# Patient Record
Sex: MALE | Race: WHITE | NOT HISPANIC OR LATINO
[De-identification: names, ages, dates, MRNs, and addresses within clinical notes are randomized per-mention and may not be internally consistent; named-entity substitution may affect disease eponyms.]

---

## 2022-10-18 ENCOUNTER — TRANSCRIPTION ENCOUNTER (OUTPATIENT)
Age: 37
End: 2022-10-18

## 2022-10-18 ENCOUNTER — APPOINTMENT (OUTPATIENT)
Dept: INTERNAL MEDICINE | Facility: CLINIC | Age: 37
End: 2022-10-18

## 2022-10-18 VITALS
HEIGHT: 68 IN | WEIGHT: 171 LBS | OXYGEN SATURATION: 97 % | DIASTOLIC BLOOD PRESSURE: 84 MMHG | BODY MASS INDEX: 25.91 KG/M2 | HEART RATE: 90 BPM | SYSTOLIC BLOOD PRESSURE: 123 MMHG

## 2022-10-18 DIAGNOSIS — Z00.00 ENCOUNTER FOR GENERAL ADULT MEDICAL EXAMINATION W/OUT ABNORMAL FINDINGS: ICD-10-CM

## 2022-10-18 DIAGNOSIS — Z23 ENCOUNTER FOR IMMUNIZATION: ICD-10-CM

## 2022-10-18 PROCEDURE — 99385 PREV VISIT NEW AGE 18-39: CPT | Mod: 25

## 2022-10-18 PROCEDURE — 36415 COLL VENOUS BLD VENIPUNCTURE: CPT

## 2022-10-18 RX ORDER — ALLOPURINOL 100 MG/1
100 TABLET ORAL
Refills: 0 | Status: ACTIVE | COMMUNITY

## 2022-10-18 NOTE — HEALTH RISK ASSESSMENT
[Good] : ~his/her~  mood as  good [Never] : Never [No] : No [No falls in past year] : Patient reported no falls in the past year

## 2022-10-18 NOTE — PHYSICAL EXAM

## 2022-10-18 NOTE — HISTORY OF PRESENT ILLNESS
[FreeTextEntry1] : Gout attacks;\par Taking Allopurinol 2019 ;\par No further attacks  [de-identified] : One baby Alive and well\par Mom and Dad \par One sister Healthy\par Hearing difficulties\par Intermittent shoulder pain Physical Therapy  on going\par On going exercise \par Got Flu vaccine \par Gets frequent respiratory infections\par Sept Covid     Fully vaccinated

## 2022-10-18 NOTE — ASSESSMENT
[FreeTextEntry1] : Normal examination except throat quite injected;\par Did take course of Penicillin given to him via [facility]\par Also will send to Rheumatology since I would stop allopurinol at this point since he has not head an attack since 2019 \par Also obtain labs to rule out mono

## 2022-10-19 ENCOUNTER — MED ADMIN CHARGE (OUTPATIENT)
Age: 37
End: 2022-10-19

## 2022-10-19 ENCOUNTER — TRANSCRIPTION ENCOUNTER (OUTPATIENT)
Age: 37
End: 2022-10-19

## 2022-10-19 LAB
BASOPHILS # BLD AUTO: 0.04 K/UL
BASOPHILS NFR BLD AUTO: 0.5 %
EOSINOPHIL # BLD AUTO: 0.15 K/UL
EOSINOPHIL NFR BLD AUTO: 2 %
HCT VFR BLD CALC: 45.1 %
HETEROPH AB SER QL: NEGATIVE
HGB BLD-MCNC: 15.6 G/DL
IMM GRANULOCYTES NFR BLD AUTO: 0.3 %
LYMPHOCYTES # BLD AUTO: 1.79 K/UL
LYMPHOCYTES NFR BLD AUTO: 23.6 %
MAN DIFF?: NORMAL
MCHC RBC-ENTMCNC: 30.5 PG
MCHC RBC-ENTMCNC: 34.6 GM/DL
MCV RBC AUTO: 88.3 FL
MONOCYTES # BLD AUTO: 0.58 K/UL
MONOCYTES NFR BLD AUTO: 7.6 %
NEUTROPHILS # BLD AUTO: 5.02 K/UL
NEUTROPHILS NFR BLD AUTO: 66 %
PLATELET # BLD AUTO: 247 K/UL
RBC # BLD: 5.11 M/UL
RBC # FLD: 12.1 %
WBC # FLD AUTO: 7.6 K/UL

## 2022-10-25 ENCOUNTER — APPOINTMENT (OUTPATIENT)
Dept: OTOLARYNGOLOGY | Facility: CLINIC | Age: 37
End: 2022-10-25

## 2022-10-25 VITALS
SYSTOLIC BLOOD PRESSURE: 114 MMHG | DIASTOLIC BLOOD PRESSURE: 75 MMHG | WEIGHT: 171 LBS | HEART RATE: 84 BPM | HEIGHT: 68 IN | BODY MASS INDEX: 25.91 KG/M2 | TEMPERATURE: 98.4 F | OXYGEN SATURATION: 97 %

## 2022-10-25 PROCEDURE — 99205 OFFICE O/P NEW HI 60 MIN: CPT | Mod: 25

## 2022-10-25 PROCEDURE — 31575 DIAGNOSTIC LARYNGOSCOPY: CPT

## 2022-10-25 RX ORDER — FLUTICASONE PROPIONATE 50 UG/1
50 SPRAY, METERED NASAL
Qty: 2 | Refills: 2 | Status: ACTIVE | COMMUNITY
Start: 2022-10-25 | End: 1900-01-01

## 2022-10-25 RX ORDER — FAMOTIDINE 20 MG/1
20 TABLET, FILM COATED ORAL
Qty: 90 | Refills: 0 | Status: ACTIVE | COMMUNITY
Start: 2022-10-25 | End: 1900-01-01

## 2022-10-25 NOTE — REASON FOR VISIT
[Initial Consultation] : an initial consultation for [FreeTextEntry2] : Persistent throat discomfort

## 2022-10-25 NOTE — PHYSICAL EXAM
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

## 2022-10-25 NOTE — HISTORY OF PRESENT ILLNESS
[de-identified] : 36 yo male who presents with concerns for chronic sore throat.  1 mo ago pt with at URI with sore throat, fatigue, fever, odynophagia, congestion and cough.  Most of the symptoms improved back to baseline other than sore throat.  Felt that sore throat lasted this entire time.  He tried netti pot, jessica tea, hot liquids, hot baths, cold pack, salt water gargles.  No issues currently chewing, eating or swallowing foods.  No breathing issues.  No voice changes.  Can feel like a fullness at times, but mostly like a sharpness.  Worse in the morning.\par \par Seen by PCP last week.  COVID, Strep, Mono, were all negative.  \par \par Diet:\par + 1 cup caffeine daily, chocolate, mint, blueberries, +citrus, tomato, garlic, onion, carbonated beverages, Kombucha\par Drinks 10 glasses of water daily\par

## 2022-10-25 NOTE — PROCEDURE
[de-identified] : -\par Procedure Note\par \par Pre-operative Diagnosis:  chronic throat pain\par Post-operative Diagnosis:  nasopharyngeal and pharyngeal edema and cobblestoning, postcricoid edema\par Anesthesia: Topical - 1 % Lidocaine/Phenylephrine\par Procedure:  Flexible Laryngoscopy\par  \par Procedure Details:  \par The patient was placed in the sitting position.  After decongestant and anesthesia were applied the laryngoscope was passed.  The nasal cavities, nasopharynx, oropharynx, hypopharynx, and larynx were all examined.  Vocal folds were examined during respiration and phonation.  The following findings were noted:\par \par Findings:  \par Nose: Septum is midline, turbinates are normal, nasal airways patent, mucosa normal\par Nasopharynx: Adenoids normal, no masses, eustachian tube normal, + evidence of pharyngeal cobblestoning\par Oropharynx: Pharyngeal walls symmetric and without lesion. Tonsils/fossae symmetric\par Hypopharynx: Hypopharynx and pyriform sinuses without lesion. No masses or asymmetry.  No pooling of secretions.\par Larynx:  Epiglottis and aryepiglottic folds were sharp and crisp bilaterally.  Bilateral false vocal folds normal appearance. Airway was widely patent.  + postcricoid edema\par TVF Appearance:  erythema of the vocal process bilaterally\par TVF Mobility:  normal\par Edema/hypertrophy: post cricoid\par Mucus on TVF: normal\par Glottic Closure: normal\par Supraglottic Hyperfunction: none\par Other Findings:\par \par Condition: Stable.  Patient tolerated procedure well.\par \par Complications: None\par

## 2022-10-25 NOTE — ASSESSMENT
[FreeTextEntry1] :  37-year-old gentleman who presents with concerns for persistent and chronic sore throat.  On exam there is evidence of some edema of the nasopharynx with cobblestoning.  There is also evidence of some postcricoid edema.  Based on history and physical exam, I do believe there is a component of laryngopharyngeal reflux.  At this time I am recommending dietary and behavioral change to reduce acid in the diet.  I am also recommending famotidine for a 3 months trial.    For nasal symptoms I am recommending nasal saline irrigation twice daily as well as nasal steroids.  Additionally I did send a throat culture to see if there is any underlying infection that we would be missing.  Patient will follow-up  via telehealth next week to review throat culture otherwise 3 months, sooner should symptoms worsen or fail to improve\par \par -  follow-up throat culture,  1 week via telehealth\par -  nasal saline irrigation, nasal steroids\par - Dietary and behavioral modification to reduce acid reflux, handout given\par - Famotidine qhs\par - Voice hygiene, increase hydration, sips of water throughout the day, avoid throat clearing\par - fu 3 mo\par

## 2022-10-26 ENCOUNTER — APPOINTMENT (OUTPATIENT)
Dept: INTERNAL MEDICINE | Facility: CLINIC | Age: 37
End: 2022-10-26

## 2022-10-31 ENCOUNTER — APPOINTMENT (OUTPATIENT)
Dept: OTOLARYNGOLOGY | Facility: CLINIC | Age: 37
End: 2022-10-31

## 2022-10-31 LAB — EAR NOSE AND THROAT CULTURE: ABNORMAL

## 2022-10-31 PROCEDURE — 99213 OFFICE O/P EST LOW 20 MIN: CPT | Mod: 95

## 2022-10-31 NOTE — HISTORY OF PRESENT ILLNESS
[de-identified] : 38 yo male who presents with concerns for chronic sore throat.  1 mo ago pt with at URI with sore throat, fatigue, fever, odynophagia, congestion and cough.  Most of the symptoms improved back to baseline other than sore throat.  Felt that sore throat lasted this entire time.  He tried netti pot, jessica tea, hot liquids, hot baths, cold pack, salt water gargles.  No issues currently chewing, eating or swallowing foods.  No breathing issues.  No voice changes.  Can feel like a fullness at times, but mostly like a sharpness.  Worse in the morning.\par \par Seen by PCP last week.  COVID, Strep, Mono, were all negative.  \par \par Diet:\par + 1 cup caffeine daily, chocolate, mint, blueberries, +citrus, tomato, garlic, onion, carbonated beverages, Kombucha\par Drinks 10 glasses of water daily\par - [FreeTextEntry1] : 10/31/2022\par  patient has been following low acid diet and behavioral modifications with some moderate improvement in symptoms.  No new ear, nose, throat symptoms otherwise.  Patient presents to review throat culture discussed neck steps.

## 2022-10-31 NOTE — ASSESSMENT
[FreeTextEntry1] :  37-year-old gentleman who presents with concerns for persistent and chronic sore throat.  On exam there is evidence of some edema of the nasopharynx with cobblestoning.  There is also evidence of some postcricoid edema.  Based on history and physical exam, I do believe there is a component of laryngopharyngeal reflux.  At this time I am recommending dietary and behavioral change to reduce acid in the diet.  I am also recommending famotidine for a 3 months trial.    For nasal symptoms I am recommending nasal saline irrigation twice daily as well as nasal steroids.  Additionally I did send a throat culture to see if there is any underlying infection that we would be missing.  Patient will follow-up  via telehealth next week to review throat culture otherwise 3 months, sooner should symptoms worsen or fail to improve\par \par Patient has been following the above regiment.  Additionally today presents to follow-up throat culture.  There was evidence of haemophilus which was susceptible to Augmentin.  This was discussed in detail.  Medication sent to pharmacy.  Patient will follow-up in 2 weeks with a repeat telehealth to see how he is doing and then 3 months to discuss throat issues in terms of reflux.\par \par \par -    Augmentin\par -  nasal saline irrigation, nasal steroids\par - Dietary and behavioral modification to reduce acid reflux, handout given\par - Famotidine qhs\par - Voice hygiene, increase hydration, sips of water throughout the day, avoid throat clearing\par - fu 3 mo\par

## 2022-10-31 NOTE — REASON FOR VISIT
[Home] : at home, [unfilled] , at the time of the visit. [Medical Office: ([facility])___] : at the medical office located in  [Patient] : the patient

## 2022-11-01 ENCOUNTER — TRANSCRIPTION ENCOUNTER (OUTPATIENT)
Age: 37
End: 2022-11-01

## 2022-11-15 ENCOUNTER — APPOINTMENT (OUTPATIENT)
Dept: OTOLARYNGOLOGY | Facility: CLINIC | Age: 37
End: 2022-11-15

## 2022-11-15 DIAGNOSIS — J02.9 ACUTE PHARYNGITIS, UNSPECIFIED: ICD-10-CM

## 2022-11-15 PROCEDURE — 99213 OFFICE O/P EST LOW 20 MIN: CPT | Mod: 95

## 2022-11-15 NOTE — HISTORY OF PRESENT ILLNESS
[de-identified] : 36 yo male who presents with concerns for chronic sore throat.  1 mo ago pt with at URI with sore throat, fatigue, fever, odynophagia, congestion and cough.  Most of the symptoms improved back to baseline other than sore throat.  Felt that sore throat lasted this entire time.  He tried netti pot, jessica tea, hot liquids, hot baths, cold pack, salt water gargles.  No issues currently chewing, eating or swallowing foods.  No breathing issues.  No voice changes.  Can feel like a fullness at times, but mostly like a sharpness.  Worse in the morning.\par \par Seen by PCP last week.  COVID, Strep, Mono, were all negative.  \par \par Diet:\par + 1 cup caffeine daily, chocolate, mint, blueberries, +citrus, tomato, garlic, onion, carbonated beverages, Kombucha\par Drinks 10 glasses of water daily\par -\par 10/31/2022\par  patient has been following low acid diet and behavioral modifications with some moderate improvement in symptoms.  No new ear, nose, throat symptoms otherwise.  Patient presents to review throat culture discussed neck steps.\par - [FreeTextEntry1] :  11/15/2022\par  patient completed the antibiotics.  He notes complete resolution of symptoms.  He feels back to baseline.  No new ear nose, throat symptoms otherwise.

## 2022-11-15 NOTE — ASSESSMENT
[FreeTextEntry1] :  37-year-old gentleman who presents with concerns for persistent and chronic sore throat.  On exam there is evidence of some edema of the nasopharynx with cobblestoning.  There is also evidence of some postcricoid edema.  Based on history and physical exam, I do believe there is a component of laryngopharyngeal reflux.  At this time I am recommending dietary and behavioral change to reduce acid in the diet.  I am also recommending famotidine for a 3 months trial.    For nasal symptoms I am recommending nasal saline irrigation twice daily as well as nasal steroids.  Additionally I did send a throat culture to see if there is any underlying infection that we would be missing.  Patient will follow-up  via telehealth next week to review throat culture otherwise 3 months, sooner should symptoms worsen or fail to improve\par \par Patient has been following the above regiment.  Additionally today presents to follow-up throat culture.  There was evidence of haemophilus which was susceptible to Augmentin.  This was discussed in detail.  Medication sent to pharmacy.  Patient will follow-up in 3 months to discuss throat issues in terms of reflux.\par \par \par - Nasal saline irrigation, nasal steroids\par - Dietary and behavioral modification to reduce acid reflux, handout given\par - Famotidine qhs\par - Voice hygiene, increase hydration, sips of water throughout the day, avoid throat clearing\par - fu 3 mo\par

## 2022-11-15 NOTE — REASON FOR VISIT
[Home] : at home, [unfilled] , at the time of the visit. [Medical Office: ([facility])___] : at the medical office located in  [Patient] : the patient [Subsequent Evaluation] : a subsequent evaluation for [FreeTextEntry2] : Pharyngitis

## 2022-11-23 ENCOUNTER — APPOINTMENT (OUTPATIENT)
Dept: RHEUMATOLOGY | Facility: CLINIC | Age: 37
End: 2022-11-23

## 2022-11-23 ENCOUNTER — RESULT REVIEW (OUTPATIENT)
Age: 37
End: 2022-11-23

## 2022-11-23 ENCOUNTER — NON-APPOINTMENT (OUTPATIENT)
Age: 37
End: 2022-11-23

## 2022-11-23 ENCOUNTER — OUTPATIENT (OUTPATIENT)
Dept: OUTPATIENT SERVICES | Facility: HOSPITAL | Age: 37
LOS: 1 days | End: 2022-11-23

## 2022-11-23 ENCOUNTER — APPOINTMENT (OUTPATIENT)
Dept: RADIOLOGY | Facility: CLINIC | Age: 37
End: 2022-11-23

## 2022-11-23 VITALS
SYSTOLIC BLOOD PRESSURE: 116 MMHG | TEMPERATURE: 98.4 F | HEART RATE: 70 BPM | DIASTOLIC BLOOD PRESSURE: 71 MMHG | BODY MASS INDEX: 25.01 KG/M2 | OXYGEN SATURATION: 96 % | WEIGHT: 165 LBS | HEIGHT: 68 IN

## 2022-11-23 DIAGNOSIS — M10.9 GOUT, UNSPECIFIED: ICD-10-CM

## 2022-11-23 PROCEDURE — 73620 X-RAY EXAM OF FOOT: CPT | Mod: 26,LT,RT

## 2022-11-23 PROCEDURE — 99204 OFFICE O/P NEW MOD 45 MIN: CPT | Mod: 25

## 2022-11-23 RX ORDER — NIRMATRELVIR AND RITONAVIR 300-100 MG
20 X 150 MG & KIT ORAL
Qty: 30 | Refills: 0 | Status: COMPLETED | COMMUNITY
Start: 2022-09-12

## 2022-11-23 RX ORDER — PENICILLIN V POTASSIUM 500 MG/1
500 TABLET, FILM COATED ORAL
Qty: 20 | Refills: 0 | Status: COMPLETED | COMMUNITY
Start: 2022-09-29

## 2022-11-23 RX ORDER — AMOXICILLIN AND CLAVULANATE POTASSIUM 875; 125 MG/1; MG/1
875-125 TABLET, COATED ORAL
Qty: 20 | Refills: 0 | Status: DISCONTINUED | COMMUNITY
Start: 2022-10-31 | End: 2022-11-23

## 2022-11-23 RX ORDER — COVID-19 ANTIGEN TEST
KIT MISCELLANEOUS
Qty: 2 | Refills: 0 | Status: COMPLETED | COMMUNITY
Start: 2022-11-13

## 2022-11-23 NOTE — HISTORY OF PRESENT ILLNESS
[FreeTextEntry1] : November 23, 2022\par Patient referred by Dr. Barclay for evaluation\par Patient with history of gout, diagnosed in 2018\par Started on allopurinol 100 mg in April 2019\par Patient has not had a gout attack since starting allopurinol in April 2019\par \par Had gout attacks August 2018, December 2018, February 2019, and  April 2019 \par Always occurred in the same joint, right 1st toe\par Had xrays which were unremarkable at first attack in 2018, no repeat xrays since that time\par At the time, unclear uric acid when diagnosed \par Review of previous records: 12/2019 uric acid 5.8\par \par When had flares, no colchicine use previously, only nsaids for pain\par No side effects from allopurinol \par No history of nephrolithiasis\par No history of tophi\par No history of diuretic use\par Recently started famotidine and fluticasone as trial for silent reflux from ENT\par \par +etoh\par +red meat\par +organ meat\par +shell fish\par \par Labs reviewed from 9/2022, no uric acid checked at that time\par

## 2022-11-23 NOTE — ASSESSMENT
[FreeTextEntry1] : 37-year-old man with history of gout, referred for rheumatology evaluation.  Patient diagnosed with gout in 2018, started on allopurinol 100 mg daily following recurrent flares of gout in the right first toe.  Since starting allopurinol in April 2019, patient has not has not had any subsequent flares.  Patient has also made dietary modifications since diagnosis but does continue high purine foods in moderation.  Review of literature regarding discontinuation of urate lowering therapies, suggests relapse of gout is common although delayed after discontinuation of ULT. Short-term prognosis after ULT discontinuation appears favorable if the serum urate level was low before ULT discontinuation.  Will update labs today, will obtain xray of the bilateral feet to evaluate for changes suggestive of gout, and will continue current allopurinol dose for now.  May consider reduction of dose to 50 mg qday pending serum uric acid level. \par \par Effects of Discontinuation of Urate-Lowering Therapy: A Systematic Review\par Katelynn Bernal 1, Renee Bailey 1, Tara Pike 2, Hubert Maisonneuve 3, lCinton Canales 4, Bettye Guillen 5

## 2022-11-23 NOTE — PHYSICAL EXAM
[General Appearance - Alert] : alert [General Appearance - In No Acute Distress] : in no acute distress [General Appearance - Well-Appearing] : healthy appearing [Sclera] : the sclera and conjunctiva were normal [] : no respiratory distress [Respiration, Rhythm And Depth] : normal respiratory rhythm and effort [Exaggerated Use Of Accessory Muscles For Inspiration] : no accessory muscle use [Edema] : there was no peripheral edema [Abnormal Walk] : normal gait [Nail Clubbing] : no clubbing  or cyanosis of the fingernails [Musculoskeletal - Swelling] : no joint swelling seen [Oriented To Time, Place, And Person] : oriented to person, place, and time [Impaired Insight] : insight and judgment were intact [Affect] : the affect was normal [Mood] : the mood was normal [FreeTextEntry1] : no active synovitis.  No tophi noted.

## 2022-11-28 ENCOUNTER — TRANSCRIPTION ENCOUNTER (OUTPATIENT)
Age: 37
End: 2022-11-28

## 2022-11-28 LAB
ALBUMIN SERPL ELPH-MCNC: 4.8 G/DL
ALP BLD-CCNC: 77 U/L
ALT SERPL-CCNC: 21 U/L
ANION GAP SERPL CALC-SCNC: 12 MMOL/L
AST SERPL-CCNC: 19 U/L
BILIRUB SERPL-MCNC: 0.4 MG/DL
BUN SERPL-MCNC: 23 MG/DL
CALCIUM SERPL-MCNC: 10 MG/DL
CHLORIDE SERPL-SCNC: 100 MMOL/L
CO2 SERPL-SCNC: 27 MMOL/L
CREAT SERPL-MCNC: 1.14 MG/DL
EGFR: 85 ML/MIN/1.73M2
GLUCOSE SERPL-MCNC: 86 MG/DL
POTASSIUM SERPL-SCNC: 4.5 MMOL/L
PROT SERPL-MCNC: 7.3 G/DL
SODIUM SERPL-SCNC: 139 MMOL/L
URATE SERPL-MCNC: 6.5 MG/DL

## 2023-01-24 ENCOUNTER — APPOINTMENT (OUTPATIENT)
Dept: OTOLARYNGOLOGY | Facility: CLINIC | Age: 38
End: 2023-01-24
Payer: MEDICAID

## 2023-01-24 VITALS
DIASTOLIC BLOOD PRESSURE: 74 MMHG | SYSTOLIC BLOOD PRESSURE: 115 MMHG | BODY MASS INDEX: 25.01 KG/M2 | HEIGHT: 68 IN | WEIGHT: 165 LBS | HEART RATE: 80 BPM | OXYGEN SATURATION: 98 %

## 2023-01-24 DIAGNOSIS — R09.81 NASAL CONGESTION: ICD-10-CM

## 2023-01-24 PROCEDURE — 99213 OFFICE O/P EST LOW 20 MIN: CPT

## 2023-01-24 NOTE — ASSESSMENT
[FreeTextEntry1] :  37-year-old gentleman who presents with concerns for persistent and chronic sore throat.  On exam there is evidence of some edema of the nasopharynx with cobblestoning.  There is also evidence of some postcricoid edema.    Patient was treated with nasal saline and nasal steroids, as well as antireflux regiment.  Previous throat culture was positive for haemophilus and patient treated with Augmentin.\par \par   At 3 months patient is completely back to baseline.  He denies any any symptoms at this time.  No ENT issues.  At this point we discussed how to wean from famotidine followed by a wean from Flonase.  Patient will then start to reintroduce foods back into his diet from an antireflux stand point.  He can follow-up with me as needed.\par \par –Wean from famotidine\par – Wean from Flonase\par – Reintroduce foods into his diet\par – Follow-up as needed

## 2023-01-24 NOTE — PHYSICAL EXAM
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

## 2023-01-24 NOTE — HISTORY OF PRESENT ILLNESS
[de-identified] : 38 yo male who presents with concerns for chronic sore throat.  1 mo ago pt with at URI with sore throat, fatigue, fever, odynophagia, congestion and cough.  Most of the symptoms improved back to baseline other than sore throat.  Felt that sore throat lasted this entire time.  He tried netti pot, jessica tea, hot liquids, hot baths, cold pack, salt water gargles.  No issues currently chewing, eating or swallowing foods.  No breathing issues.  No voice changes.  Can feel like a fullness at times, but mostly like a sharpness.  Worse in the morning.\par \par Seen by PCP last week.  COVID, Strep, Mono, were all negative.  \par \par Diet:\par + 1 cup caffeine daily, chocolate, mint, blueberries, +citrus, tomato, garlic, onion, carbonated beverages, Kombucha\par Drinks 10 glasses of water daily\par -\par 10/31/2022\par  patient has been following low acid diet and behavioral modifications with some moderate improvement in symptoms.  No new ear, nose, throat symptoms otherwise.  Patient presents to review throat culture discussed neck steps.\par - [FreeTextEntry1] :  11/15/2022\par  patient completed the antibiotics.  He notes complete resolution of symptoms.  He feels back to baseline.  No new ear nose, throat symptoms otherwise.\par -\par  1/24/2023\par  patient has been using nasal saline and nasal steroids.  He is also been using antireflux medication, famotidine at nighttime and following dietary and behavioral modification to reduce acid reflux.  He has had remarkable improvement.  He feels like he is completely back to baseline.  He is at very happy with the results.  He denies any ear, nose, throat symptoms at this time.

## 2023-05-12 ENCOUNTER — NON-APPOINTMENT (OUTPATIENT)
Age: 38
End: 2023-05-12

## 2023-05-13 ENCOUNTER — EMERGENCY (EMERGENCY)
Facility: HOSPITAL | Age: 38
LOS: 1 days | Discharge: HOME | End: 2023-05-13
Attending: STUDENT IN AN ORGANIZED HEALTH CARE EDUCATION/TRAINING PROGRAM | Admitting: STUDENT IN AN ORGANIZED HEALTH CARE EDUCATION/TRAINING PROGRAM
Payer: COMMERCIAL

## 2023-05-13 VITALS
SYSTOLIC BLOOD PRESSURE: 104 MMHG | DIASTOLIC BLOOD PRESSURE: 72 MMHG | TEMPERATURE: 97 F | WEIGHT: 162.04 LBS | OXYGEN SATURATION: 96 % | RESPIRATION RATE: 18 BRPM | HEIGHT: 67 IN | HEART RATE: 107 BPM

## 2023-05-13 VITALS
RESPIRATION RATE: 18 BRPM | OXYGEN SATURATION: 99 % | TEMPERATURE: 98 F | SYSTOLIC BLOOD PRESSURE: 110 MMHG | HEART RATE: 80 BPM | DIASTOLIC BLOOD PRESSURE: 75 MMHG

## 2023-05-13 DIAGNOSIS — R19.7 DIARRHEA, UNSPECIFIED: ICD-10-CM

## 2023-05-13 LAB
ADV 40+41 DNA STL QL NAA+NON-PROBE: SIGNIFICANT CHANGE UP
ALBUMIN SERPL ELPH-MCNC: 4.1 G/DL — SIGNIFICANT CHANGE UP (ref 3.3–5)
ALP SERPL-CCNC: 97 U/L — SIGNIFICANT CHANGE UP (ref 40–120)
ALT FLD-CCNC: 15 U/L — SIGNIFICANT CHANGE UP (ref 10–45)
ANION GAP SERPL CALC-SCNC: 11 MMOL/L — SIGNIFICANT CHANGE UP (ref 5–17)
APPEARANCE UR: CLEAR — SIGNIFICANT CHANGE UP
AST SERPL-CCNC: 14 U/L — SIGNIFICANT CHANGE UP (ref 10–40)
ASTROVIRUS: SIGNIFICANT CHANGE UP
BACTERIA # UR AUTO: ABNORMAL /HPF
BASOPHILS # BLD AUTO: 0.03 K/UL — SIGNIFICANT CHANGE UP (ref 0–0.2)
BASOPHILS NFR BLD AUTO: 0.3 % — SIGNIFICANT CHANGE UP (ref 0–2)
BILIRUB SERPL-MCNC: 0.5 MG/DL — SIGNIFICANT CHANGE UP (ref 0.2–1.2)
BILIRUB UR-MCNC: ABNORMAL
BUN SERPL-MCNC: 14 MG/DL — SIGNIFICANT CHANGE UP (ref 7–23)
C CAYETANENSIS DNA STL QL NAA+NON-PROBE: SIGNIFICANT CHANGE UP
CALCIUM SERPL-MCNC: 9.5 MG/DL — SIGNIFICANT CHANGE UP (ref 8.4–10.5)
CAMPYLOBACTER DNA SPEC NAA+PROBE: SIGNIFICANT CHANGE UP
CHLORIDE SERPL-SCNC: 99 MMOL/L — SIGNIFICANT CHANGE UP (ref 96–108)
CO2 SERPL-SCNC: 24 MMOL/L — SIGNIFICANT CHANGE UP (ref 22–31)
COLOR SPEC: YELLOW — SIGNIFICANT CHANGE UP
COMMENT - URINE: SIGNIFICANT CHANGE UP
CREAT SERPL-MCNC: 1.24 MG/DL — SIGNIFICANT CHANGE UP (ref 0.5–1.3)
CRYPTOSP DNA STL QL NAA+PROBE: SIGNIFICANT CHANGE UP
DIFF PNL FLD: NEGATIVE — SIGNIFICANT CHANGE UP
E COLI SXT SPEC: SIGNIFICANT CHANGE UP
E HISTOLYT DNA STL QL NAA+NON-PROBE: SIGNIFICANT CHANGE UP
EC EAEA GENE STL QL NAA+PROBE: SIGNIFICANT CHANGE UP
EGFR: 77 ML/MIN/1.73M2 — SIGNIFICANT CHANGE UP
EOSINOPHIL # BLD AUTO: 0.04 K/UL — SIGNIFICANT CHANGE UP (ref 0–0.5)
EOSINOPHIL NFR BLD AUTO: 0.4 % — SIGNIFICANT CHANGE UP (ref 0–6)
EPEC DNA STL QL NAA+PROBE: SIGNIFICANT CHANGE UP
EPI CELLS # UR: SIGNIFICANT CHANGE UP /HPF (ref 0–5)
ETEC DNA STL QL NAA+PROBE: SIGNIFICANT CHANGE UP
G LAMBLIA DNA STL QL NAA+NON-PROBE: SIGNIFICANT CHANGE UP
GI PCR PANEL: SIGNIFICANT CHANGE UP
GLUCOSE SERPL-MCNC: 118 MG/DL — HIGH (ref 70–99)
GLUCOSE UR QL: NEGATIVE — SIGNIFICANT CHANGE UP
HCT VFR BLD CALC: 54.1 % — HIGH (ref 39–50)
HGB BLD-MCNC: 18.2 G/DL — HIGH (ref 13–17)
IMM GRANULOCYTES NFR BLD AUTO: 0.3 % — SIGNIFICANT CHANGE UP (ref 0–0.9)
KETONES UR-MCNC: 40 MG/DL
LEUKOCYTE ESTERASE UR-ACNC: NEGATIVE — SIGNIFICANT CHANGE UP
LYMPHOCYTES # BLD AUTO: 2 K/UL — SIGNIFICANT CHANGE UP (ref 1–3.3)
LYMPHOCYTES # BLD AUTO: 21.4 % — SIGNIFICANT CHANGE UP (ref 13–44)
MCHC RBC-ENTMCNC: 30 PG — SIGNIFICANT CHANGE UP (ref 27–34)
MCHC RBC-ENTMCNC: 33.6 GM/DL — SIGNIFICANT CHANGE UP (ref 32–36)
MCV RBC AUTO: 89.3 FL — SIGNIFICANT CHANGE UP (ref 80–100)
MONOCYTES # BLD AUTO: 0.8 K/UL — SIGNIFICANT CHANGE UP (ref 0–0.9)
MONOCYTES NFR BLD AUTO: 8.6 % — SIGNIFICANT CHANGE UP (ref 2–14)
NEUTROPHILS # BLD AUTO: 6.43 K/UL — SIGNIFICANT CHANGE UP (ref 1.8–7.4)
NEUTROPHILS NFR BLD AUTO: 69 % — SIGNIFICANT CHANGE UP (ref 43–77)
NITRITE UR-MCNC: NEGATIVE — SIGNIFICANT CHANGE UP
NOROVIRUS GI+II RNA STL QL NAA+NON-PROBE: SIGNIFICANT CHANGE UP
NRBC # BLD: 0 /100 WBCS — SIGNIFICANT CHANGE UP (ref 0–0)
P SHIGELLOIDES DNA STL QL NAA+NON-PROBE: SIGNIFICANT CHANGE UP
PH UR: 6.5 — SIGNIFICANT CHANGE UP (ref 5–8)
PLATELET # BLD AUTO: 231 K/UL — SIGNIFICANT CHANGE UP (ref 150–400)
POTASSIUM SERPL-MCNC: 4.5 MMOL/L — SIGNIFICANT CHANGE UP (ref 3.5–5.3)
POTASSIUM SERPL-SCNC: 4.5 MMOL/L — SIGNIFICANT CHANGE UP (ref 3.5–5.3)
PROT SERPL-MCNC: 7.3 G/DL — SIGNIFICANT CHANGE UP (ref 6–8.3)
PROT UR-MCNC: 100 MG/DL
RBC # BLD: 6.06 M/UL — HIGH (ref 4.2–5.8)
RBC # FLD: 13.6 % — SIGNIFICANT CHANGE UP (ref 10.3–14.5)
RBC CASTS # UR COMP ASSIST: < 5 /HPF — SIGNIFICANT CHANGE UP
RV RNA STL NAA+PROBE: SIGNIFICANT CHANGE UP
SALMONELLA DNA STL QL NAA+PROBE: SIGNIFICANT CHANGE UP
SAPOVIRUS (GENOGROUPS I, II, IV, AND V): SIGNIFICANT CHANGE UP
SHIGELLA DNA SPEC QL NAA+PROBE: SIGNIFICANT CHANGE UP
SODIUM SERPL-SCNC: 134 MMOL/L — LOW (ref 135–145)
SP GR SPEC: >=1.03 — SIGNIFICANT CHANGE UP (ref 1–1.03)
UROBILINOGEN FLD QL: 1 E.U./DL — SIGNIFICANT CHANGE UP
VIBRIO CHOL TOXIN CTXA STL QL NAA+PROBE: SIGNIFICANT CHANGE UP
VIBRIO CHOL TOXIN CTXA STL QL NAA+PROBE: SIGNIFICANT CHANGE UP
WBC # BLD: 9.33 K/UL — SIGNIFICANT CHANGE UP (ref 3.8–10.5)
WBC # FLD AUTO: 9.33 K/UL — SIGNIFICANT CHANGE UP (ref 3.8–10.5)
WBC UR QL: ABNORMAL /HPF
Y ENTEROCOL DNA STL QL NAA+NON-PROBE: SIGNIFICANT CHANGE UP

## 2023-05-13 PROCEDURE — 36415 COLL VENOUS BLD VENIPUNCTURE: CPT

## 2023-05-13 PROCEDURE — 99284 EMERGENCY DEPT VISIT MOD MDM: CPT

## 2023-05-13 PROCEDURE — 80053 COMPREHEN METABOLIC PANEL: CPT

## 2023-05-13 PROCEDURE — 81001 URINALYSIS AUTO W/SCOPE: CPT

## 2023-05-13 PROCEDURE — 85025 COMPLETE CBC W/AUTO DIFF WBC: CPT

## 2023-05-13 PROCEDURE — 99283 EMERGENCY DEPT VISIT LOW MDM: CPT

## 2023-05-13 PROCEDURE — 87507 IADNA-DNA/RNA PROBE TQ 12-25: CPT

## 2023-05-13 RX ORDER — SODIUM CHLORIDE 9 MG/ML
1000 INJECTION INTRAMUSCULAR; INTRAVENOUS; SUBCUTANEOUS ONCE
Refills: 0 | Status: COMPLETED | OUTPATIENT
Start: 2023-05-13 | End: 2023-05-13

## 2023-05-13 RX ADMIN — SODIUM CHLORIDE 1000 MILLILITER(S): 9 INJECTION INTRAMUSCULAR; INTRAVENOUS; SUBCUTANEOUS at 14:07

## 2023-05-13 NOTE — ED ADULT NURSE NOTE - NS_ED_NURSE_TEACHING_TOPIC_ED_A_ED
patient came in ED from Urgent care BIBA with c/o cough and on Afib rhythm. patient denies chest pain, denies dizziness.
Digestive

## 2023-05-13 NOTE — ED ADULT TRIAGE NOTE - GLASGOW COMA SCALE: SCORE, MLM
15 Consent: Written consent obtained. Risks include but not limited to lid/brow ptosis, bruising, swelling, diplopia, temporary effect, incomplete chemical denervation.

## 2023-05-13 NOTE — ED PROVIDER NOTE - CLINICAL SUMMARY MEDICAL DECISION MAKING FREE TEXT BOX
38 y/o m presents c/o diarrhea for the past week; pt afebrile in ED, vitals unremarkable, nontoxic appearing and ambulatory with steady gait.  Will check labs, give IV hydration, GI PCR if he provides a stool sample.  Likely sx are foodborne, no indication for imaging at this time, pt with no pain or tenderness on exam.  Will f/u labs and reassess.

## 2023-05-13 NOTE — ED ADULT NURSE NOTE - NSFALLUNIVINTERV_ED_ALL_ED
Bed/Stretcher in lowest position, wheels locked, appropriate side rails in place/Call bell, personal items and telephone in reach/Instruct patient to call for assistance before getting out of bed/chair/stretcher/Non-slip footwear applied when patient is off stretcher/[place] to call system/Physically safe environment - no spills, clutter or unnecessary equipment/Purposeful proactive rounding/Room/bathroom lighting operational, light cord in reach

## 2023-05-13 NOTE — ED PROVIDER NOTE - PATIENT PORTAL LINK FT
You can access the FollowMyHealth Patient Portal offered by [facility] by registering at the following website: [email or web address]. By joining [facility]’s FollowMyHealth portal, you will also be able to view your health information using other applications (apps) compatible with our system.

## 2023-05-13 NOTE — ED PROVIDER NOTE - ATTENDING CONTRIBUTION TO CARE
38 y/o m no pmh presents c/o diarrhea x 1 week, abd soft and nontender, no fever. Will check basic labs, GI panel, reassess.

## 2023-05-13 NOTE — ED PROVIDER NOTE - PROGRESS NOTE DETAILS
labs with no significant abnormalities, pt feeling improved with IV fluid, GI PCR sent. Will d/c, continue oral hydration, will f/u GI PCR to determine appropriate treatment

## 2023-05-13 NOTE — ED PROVIDER NOTE - NS ED ATTENDING STATEMENT MOD
I have seen and examined this patient and fully participated in the care of this patient as the teaching attending.  The service was shared with the JARROD.  I reviewed and verified the documentation and independently performed the documented:

## 2023-05-13 NOTE — ED ADULT NURSE NOTE - FINAL NURSING ELECTRONIC SIGNATURE
13-May-2023 15:22 Manual Repair Warning Statement: We plan on removing the manually selected variable below in favor of our much easier automatic structured text blocks found in the previous tab. We decided to do this to help make the flow better and give you the full power of structured data. Manual selection is never going to be ideal in our platform and I would encourage you to avoid using manual selection from this point on, especially since I will be sunsetting this feature. It is important that you do one of two things with the customized text below. First, you can save all of the text in a word file so you can have it for future reference. Second, transfer the text to the appropriate area in the Library tab. Lastly, if there is a flap or graft type which we do not have you need to let us know right away so I can add it in before the variable is hidden. No need to panic, we plan to give you roughly 6 months to make the change.

## 2023-05-13 NOTE — ED ADULT NURSE NOTE - OBJECTIVE STATEMENT
Pt to ED for 6 days diarrhea, estimates 5-8 episodes per day for 6 days. Pt reports he drank raw, unpasteurized milk 1 week ago and is concerned it led to food borne illness. Pt endorses mild abd pain, nausea, weakness, fatigue. Denies bloody stool. Denies fever, chills, vomiting, CP, SOB, palpitations, syncopal events. Pt reports he is tolerating mild PO intake and drinking pedialyte daily to maintain hydration status. Pt took immodium x1 today at suggestion of PCP and has not had diarrhea since. Pt A&ox4. Ambulatory.

## 2023-05-13 NOTE — ED PROVIDER NOTE - OBJECTIVE STATEMENT
36 y/o m no pmh presents c/o diarrhea x 1 week.  Pt stating when it first started he was having frequent bowel movements, thinks 10-15 in a day for the first 2 days and then having mild sx over the next few days but reports in the past 24 hours has had an increase in frequency, maybe around 10 watery, nonbloody bowel movements and feeling lightheaded.  Pt reports he visited a farm and drank raw mild from a cow just prior to the onset of his sx. Denies abd pain, fever, n/v, urinary sx, all other ROS negative.

## 2023-05-13 NOTE — ED PROVIDER NOTE - NSFOLLOWUPINSTRUCTIONS_ED_ALL_ED_FT
Food Choices to Help Relieve Diarrhea, Adult  Diarrhea can make you feel weak and cause you to become dehydrated. It is important to choose the right foods and drinks to:  Relieve diarrhea.  Replace lost fluids and nutrients.  Prevent dehydration.  What are tips for following this plan?  Relieving diarrhea    Avoid foods that make your diarrhea worse. These may include:  Foods and beverages sweetened with high-fructose corn syrup, honey, or sweeteners such as xylitol, sorbitol, and mannitol.  Fried, greasy, or spicy foods.  Raw fruits and vegetables.  Eat foods that are rich in probiotics. These include foods such as yogurt and fermented milk products. Probiotics can help increase healthy bacteria in your stomach and intestines (gastrointestinal tract or GI tract). This may help digestion and stop diarrhea.  If you have lactose intolerance, avoid dairy products. These may make your diarrhea worse.  Take medicine to help stop diarrhea only as told by your health care provider.  Replacing nutrients      Eat bland, easy-to-digest foods in small amounts as you are able, until your diarrhea starts to get better. These foods include bananas, applesauce, rice, toast, and crackers.  Gradually reintroduce nutrient-rich foods as tolerated or as told by your health care provider. This includes:  Well-cooked protein foods, such as eggs, lean meats like fish or chicken without skin, and tofu.  Peeled, seeded, and soft-cooked fruits and vegetables.  Low-fat dairy products.  Whole grains.  Take vitamin and mineral supplements as told by your health care provider.  Preventing dehydration      Start by sipping water or a solution to prevent dehydration (oral rehydration solution, ORS). This is a drink that helps replace fluids and minerals your body has lost. You can buy an ORS at pharmacies and retail stores.  Try to drink at least 8–10 cups (2,000–2,500 mL) of fluid each day to help replace lost fluids. If you have urine that is pale yellow, you are getting enough fluids.  You may drink other liquids in addition to water, such as fruit juice that you have added water to (diluted fruit juice) or low-calorie sports drinks, as tolerated or as told by your health care provider.  Avoid drinks with caffeine, such as coffee, tea, or soft drinks.  Avoid alcohol.  Summary  When you have diarrhea, it is important to choose the right foods and drinks to relieve diarrhea, to replace lost fluids and nutrients, and to prevent dehydration.  Make sure you drink enough fluid to keep your urine pale yellow.  You may benefit from eating bland foods at first. Gradually reintroduce healthy, nutrient-rich foods as tolerated or as told by your health care provider.  Avoid foods that make your diarrhea worse, such as fried, greasy, or spicy foods.  This information is not intended to replace advice given to you by your health care provider. Make sure you discuss any questions you have with your health care provider.

## 2023-05-14 RX ORDER — AZITHROMYCIN 500 MG/1
1 TABLET, FILM COATED ORAL
Qty: 6 | Refills: 0
Start: 2023-05-14 | End: 2023-05-16